# Patient Record
Sex: MALE | Race: WHITE | NOT HISPANIC OR LATINO | Employment: UNEMPLOYED | URBAN - METROPOLITAN AREA
[De-identification: names, ages, dates, MRNs, and addresses within clinical notes are randomized per-mention and may not be internally consistent; named-entity substitution may affect disease eponyms.]

---

## 2023-05-13 DIAGNOSIS — F32.A DEPRESSION, UNSPECIFIED: ICD-10-CM

## 2023-05-13 DIAGNOSIS — F41.9 ANXIETY DISORDER, UNSPECIFIED: ICD-10-CM

## 2023-05-15 RX ORDER — VENLAFAXINE HYDROCHLORIDE 75 MG/1
CAPSULE, EXTENDED RELEASE ORAL
Qty: 90 CAPSULE | Refills: 3 | Status: SHIPPED | OUTPATIENT
Start: 2023-05-15 | End: 2024-05-02 | Stop reason: SDUPTHER

## 2024-05-02 ENCOUNTER — OFFICE VISIT (OUTPATIENT)
Dept: PRIMARY CARE | Facility: CLINIC | Age: 34
End: 2024-05-02
Payer: COMMERCIAL

## 2024-05-02 VITALS
WEIGHT: 135.8 LBS | OXYGEN SATURATION: 98 % | DIASTOLIC BLOOD PRESSURE: 80 MMHG | SYSTOLIC BLOOD PRESSURE: 119 MMHG | BODY MASS INDEX: 22.6 KG/M2 | HEART RATE: 96 BPM

## 2024-05-02 DIAGNOSIS — F32.A DEPRESSION, UNSPECIFIED: ICD-10-CM

## 2024-05-02 DIAGNOSIS — F41.9 ANXIETY DISORDER, UNSPECIFIED: ICD-10-CM

## 2024-05-02 DIAGNOSIS — Z13.220 LIPID SCREENING: ICD-10-CM

## 2024-05-02 DIAGNOSIS — Z83.2 FAMILY HISTORY OF AUTOIMMUNE DISORDER: ICD-10-CM

## 2024-05-02 DIAGNOSIS — Z00.00 HEALTH MAINTENANCE EXAMINATION: Primary | ICD-10-CM

## 2024-05-02 DIAGNOSIS — E55.9 VITAMIN D DEFICIENCY: ICD-10-CM

## 2024-05-02 DIAGNOSIS — Z13.0 SCREENING FOR DEFICIENCY ANEMIA: ICD-10-CM

## 2024-05-02 PROCEDURE — 99385 PREV VISIT NEW AGE 18-39: CPT | Performed by: STUDENT IN AN ORGANIZED HEALTH CARE EDUCATION/TRAINING PROGRAM

## 2024-05-02 RX ORDER — VENLAFAXINE HYDROCHLORIDE 75 MG/1
75 CAPSULE, EXTENDED RELEASE ORAL DAILY
Qty: 90 CAPSULE | Refills: 3 | Status: SHIPPED | OUTPATIENT
Start: 2024-05-02

## 2024-05-02 NOTE — PROGRESS NOTES
Gilles Cabrera is a 34 y.o. male seen in Clinic at [facility] by Dr. Giuseppe Mcgee on 05/02/24 for routine care, as well as for management of the following chronic medical conditions: Anxiety/Depression. Patient presents today to establish care and for CPE.     #Anxiety/Depression  - SNRI, Venlafaxine 75mg daily   - Has been on for last 5 years or so  - interested in possible trial off though defers today   - would like to discuss with psychiatry  [  ] referral placed     Past Medical History: as above   Past Medical History:   Diagnosis Date    Personal history of traumatic brain injury     History of concussion     Subspecialty Medical Care: counseling     Past Surgical History: no prior surgeries   No past surgical history on file.    Medications:  Current Outpatient Medications:     venlafaxine XR (Effexor-XR) 75 mg 24 hr capsule, TAKE 1 CAPSULE BY MOUTH EVERY DAY, Disp: 90 capsule, Rfl: 3  [facility]     Allergies: NKDA  No Known Allergies    Immunizations:   - last Tdap within last 10 years (around 6776-4498)   - Flu annually   - recommend staying UTD with COVID booster   - unclear HPV status     Family History:   - Oldest brother with T1DM (around 18 years of age)  - Nephew with Crohn's Disease   - Father with Bipolar Disorder  - Brother with Bipolar Disorder   - Maternal grandfather with lung cancer (smoker)  - Maternal grandmother with breast cancer  - Mother with possible blood disorder (unclear details)  No family history on file.    Social History:   Home/Living Situation/Falls/Safety Assessment: from [place]; schooling at [employer]; rental with three friends; feels safe at home   Education/Employment/Work/Vocational: works for [employer];   Activities: encouraged physical activity   Drug Use: some MJ use  Diet: no dietary concerns   Depression/Anxiety: known anxiety; possibly some immature coping mechanisms   Sexuality/Contraception/Menstrual  History: defers sti screening   Sleep: some insomnia concerns     Patient Information:  Health Insurance: has insurance   Transportation: drives   Healthcare POA/Guardian/Emergency Contact: mother     Visit Vitals  /80   Pulse 96   Wt 61.6 kg (135 lb 12.8 oz)   SpO2 98%   BMI 22.60 kg/m²   BSA 1.68 m²      PHYSICAL EXAM:   General: well appearing  male, NAD, pleasant and engaged in encounter    HEENT: NCAT, MMM  CV: RRR, no m/r/g  PULM: CTAB, non-labored respirations   ABD: soft, NT, ND, + bowel sounds   : no suprapubic or CVA tenderness   EXT: WWP, no significant edema   SKIN: no rashes noted   NEURO: A&Ox4, symmetric facies, no gross motor or sensory deficits, normal gait  PSYCH: pleasant mood, appropriate affect     Assessment/Plan    Gilles Cabrera is a 34 y.o. male seen in Clinic at [facility] by Dr. Giuseppe Mcgee on 05/02/24 for routine care, as well as for management of the following chronic medical conditions: [phone]     #Health Maintenance    Cancer Screening  - Colorectal Cancer Screening: due at 45  - Lung Cancer Screening: non-smoker   - Prostate Cancer Screening: due at 55     Laboratory Screening  - Lipid Screen: prior 2019 reassuring; repeat today   - ASCVD Score: NA  - A1C, glucose screen: CMP today   - STI, HIV, Hep B screen: defers  - Hep C screen: defers    Imaging Screening  - AAA screening: due at 65     Immunizations:   - Influenza: annual recommended  - COVID: recommend staying UTD  - Tdap: last around 8894-4638  - HPV: unclear, recommended; will consider   - Prevnar, Pneumovax: due at 65  - Shingrix: due at 50    Other Screening  - Health Literacy Assessment: excellent  - Depression screen: known diagnosis   - Home safety/partner violence screen: negative   - Hearing/Vision screens:   - Alcohol/tobacco/drug use screen: social MJ use, no tobacco use   - Healthcare POA/Advanced Directives: mother     Referrals: psychiatry, labs     Patient Discussion:    Please call back the  office with any questions at [phone]. In the case of an emergency, please call 911 or go to the nearest Emergency Department.      Giuseppe Mcgee MD  Internal Medicine-Pediatrics  [facility] [address], [address]  P: [phone], F: [phone]

## 2024-05-02 NOTE — PATIENT INSTRUCTIONS
[facility]  [email or web address]  [phone]    [facility]   [email or web address]/  [phone]      Psychiatry Referral  Call [phone] to schedule    FASTING labs (OK to have water) in [address] in this building  No appointment needed  Closed on Sundays  Opens in AM at 7:30    Med refill sent to your pharmacy    Reach out with any questions or concerns!    Dr. PONCHO Polanco

## 2024-10-13 ENCOUNTER — OFFICE VISIT (OUTPATIENT)
Dept: URGENT CARE | Age: 34
End: 2024-10-13
Payer: COMMERCIAL

## 2024-10-13 VITALS
DIASTOLIC BLOOD PRESSURE: 72 MMHG | SYSTOLIC BLOOD PRESSURE: 110 MMHG | OXYGEN SATURATION: 98 % | TEMPERATURE: 97.7 F | HEART RATE: 83 BPM | BODY MASS INDEX: 23.3 KG/M2 | WEIGHT: 140 LBS

## 2024-10-13 DIAGNOSIS — Z20.822 COVID-19 VIRUS RNA NOT DETECTED: ICD-10-CM

## 2024-10-13 DIAGNOSIS — B34.9 VIRAL ILLNESS: Primary | ICD-10-CM

## 2024-10-13 DIAGNOSIS — R51.9 INTRACTABLE HEADACHE, UNSPECIFIED CHRONICITY PATTERN, UNSPECIFIED HEADACHE TYPE: ICD-10-CM

## 2024-10-13 DIAGNOSIS — R05.1 ACUTE COUGH: ICD-10-CM

## 2024-10-13 LAB — POC SARS-COV-2 AG BINAX: NORMAL

## 2024-10-13 PROCEDURE — 1036F TOBACCO NON-USER: CPT | Performed by: PHYSICIAN ASSISTANT

## 2024-10-13 PROCEDURE — 99203 OFFICE O/P NEW LOW 30 MIN: CPT | Performed by: PHYSICIAN ASSISTANT

## 2024-10-13 PROCEDURE — 87811 SARS-COV-2 COVID19 W/OPTIC: CPT | Performed by: PHYSICIAN ASSISTANT
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
## 2024-10-13 NOTE — PATIENT INSTRUCTIONS
"Flu  What is the flu? - The flu is an infection that can cause fever, cough, body aches, and other symptoms. The most common type of flu is the \"seasonal\" flu. There are different forms of seasonal flu, for example, \"type A\" and \"type B.\" All forms of the flu are caused by viruses. The medical term for the flu is \"influenza.\"  What are the most common flu symptoms? - All forms of the flu can cause:  -Fever (temperature higher than 100ºF or 37.8ºC)  -Extreme tiredness  -Headache or body aches  -Cough  -Sore throat  -Runny nose  Flu symptoms can come on very suddenly.    Is the flu dangerous? - It can be. Most people get over the flu on their own, without any lasting problems. But some people need to go to the hospital because of the flu. And some people even die from it. This is because the flu can cause a serious lung infection called pneumonia. That's why it's important to keep from getting the flu in the first place.  People at higher risk of getting very sick from the flu include: People 65 or older, Very young children, Pregnant women, People with certain other medical problems    How can I protect myself from the flu? - You can:  -Wash your hands often with soap and water, or use alcohol hand rubs  -Stay away from people you know are sick  -Get the flu vaccine every year - Some years the flu vaccine is more effective than others. But even in years when it is less effective, it still helps prevent some cases of the flu. It can also help keep you from getting severely ill if you do get the flu.    Most people with the flu get better on their own within 1 to 2 weeks. But you should go to the ER or call 911 if you:  -Have trouble breathing or are short of breath  -Feel pain or pressure in your chest or belly  -Get suddenly dizzy  -Feel confused  -Have severe vomiting  -Starts to turn blue or purple  -Are not drinking enough fluids  -If you or your child will not wake up or will not interact with you  -Gets better " from the flu but then gets sick again with a fever or cough    Home Care:   1.Drink plenty of fluids, avoid pop or sugary drinks.  2.Rest.  3.Take Acetaminophen or ibuprofen as needed for pain or fever.  4.Cough and sneeze into your elbow/sleeve.    Call your doctor or go to the emergency department if worse or:  1. Fever is irreducible with Tylenol, ibuprofen.  2. There is blood in the stool (poop) or diarrhea or if the stool (poop) is black.   3. Lots of diarrhea occurs.   4. Lots of vomiting occurs or the vomit is bloody or green or looks like chocolate or coffee.   5. The belly looks very full or big.   6. Symptoms of dehydration occurs (eg, inside of the mouth looks sticky, urinating less, weakness, tiredness, pale color, eyes look hollow or sunken).   7. Abdominal pain is worse.  8. Unable to eat or drink or urinate

## 2025-03-24 ENCOUNTER — OFFICE VISIT (OUTPATIENT)
Dept: PRIMARY CARE | Facility: CLINIC | Age: 35
End: 2025-03-24
Payer: COMMERCIAL

## 2025-03-24 VITALS
SYSTOLIC BLOOD PRESSURE: 110 MMHG | DIASTOLIC BLOOD PRESSURE: 73 MMHG | RESPIRATION RATE: 12 BRPM | OXYGEN SATURATION: 95 % | HEIGHT: 65 IN | BODY MASS INDEX: 21.66 KG/M2 | HEART RATE: 100 BPM | WEIGHT: 130 LBS

## 2025-03-24 DIAGNOSIS — Z00.00 HEALTH CARE MAINTENANCE: Primary | ICD-10-CM

## 2025-03-24 DIAGNOSIS — J20.8 ACUTE BACTERIAL BRONCHITIS: ICD-10-CM

## 2025-03-24 DIAGNOSIS — B96.89 ACUTE BACTERIAL BRONCHITIS: ICD-10-CM

## 2025-03-24 DIAGNOSIS — R05.2 SUBACUTE COUGH: ICD-10-CM

## 2025-03-24 DIAGNOSIS — R53.83 OTHER FATIGUE: ICD-10-CM

## 2025-03-24 PROCEDURE — 99213 OFFICE O/P EST LOW 20 MIN: CPT | Performed by: INTERNAL MEDICINE

## 2025-03-24 PROCEDURE — 3008F BODY MASS INDEX DOCD: CPT | Performed by: INTERNAL MEDICINE

## 2025-03-24 RX ORDER — AZITHROMYCIN 250 MG/1
TABLET, FILM COATED ORAL
Qty: 6 TABLET | Refills: 0 | Status: SHIPPED | OUTPATIENT
Start: 2025-03-24 | End: 2025-03-29

## 2025-03-24 ASSESSMENT — PATIENT HEALTH QUESTIONNAIRE - PHQ9
2. FEELING DOWN, DEPRESSED OR HOPELESS: NOT AT ALL
SUM OF ALL RESPONSES TO PHQ9 QUESTIONS 1 AND 2: 0
1. LITTLE INTEREST OR PLEASURE IN DOING THINGS: NOT AT ALL

## 2025-03-24 ASSESSMENT — ENCOUNTER SYMPTOMS
DEPRESSION: 0
OCCASIONAL FEELINGS OF UNSTEADINESS: 0
LOSS OF SENSATION IN FEET: 0

## 2025-03-24 ASSESSMENT — COLUMBIA-SUICIDE SEVERITY RATING SCALE - C-SSRS
6. HAVE YOU EVER DONE ANYTHING, STARTED TO DO ANYTHING, OR PREPARED TO DO ANYTHING TO END YOUR LIFE?: NO
1. IN THE PAST MONTH, HAVE YOU WISHED YOU WERE DEAD OR WISHED YOU COULD GO TO SLEEP AND NOT WAKE UP?: NO
2. HAVE YOU ACTUALLY HAD ANY THOUGHTS OF KILLING YOURSELF?: NO

## 2025-03-24 NOTE — PROGRESS NOTES
"Subjective   Patient ID: Gilles Cabrera is a 35 y.o. male who presents for Cough (Pt is here for cough, runny nose, fever).    HPI follow-up visit and sick visit has basically been ill for several weeks most likely had influenza as that is what his roommates had but now has persisted with low-grade fever has been coughing now productive of phlegm no bowel issues has been trying Motrin but not much in the way of cold preparations has been off of his Effexor was briefly switched to something else but now on nothing    Review of Systems    Objective   /81   Pulse (!) 120   Ht 1.651 m (5' 5\")   Wt 59 kg (130 lb)   SpO2 95%   BMI 21.63 kg/m²     Physical Exam vital signs noted alert and oriented x 3 NCAT no coryza nares heavy discharge OP benign TM normal bilateral EAC clear bilateral no AC nodes no JVD chest clear to auscultation with some bronchial breath sounds no wheezing CV regular rate and rhythm S1-S2 slightly tacky extremities no clubbing cyanosis or edema normal distal pulses    Assessment/Plan   {Assess/PlanSmartLinks:43771} impression post viral acute bacterial bronchitis with cough and fatigue  Plan good nutrition good water consumption okay for prescription Zithromax 250 mg tablet take 2 tablets first day 1 breeches next 4 days #1 traditional Z-Pacheco and 0 refills proper rest May use Motrin 2 tablets 3 times daily for any low-grade fever okay for cough medication or tablets and then recheck if no better and follow-up with Dr. Sobia Jarquin       "